# Patient Record
Sex: MALE | Race: WHITE | NOT HISPANIC OR LATINO | Employment: OTHER | URBAN - NONMETROPOLITAN AREA
[De-identification: names, ages, dates, MRNs, and addresses within clinical notes are randomized per-mention and may not be internally consistent; named-entity substitution may affect disease eponyms.]

---

## 2017-05-04 ENCOUNTER — TRANSCRIBE ORDERS (OUTPATIENT)
Dept: PODIATRY | Facility: CLINIC | Age: 57
End: 2017-05-04

## 2017-05-04 DIAGNOSIS — IMO0002 UNCONTROLLED TYPE 2 DIABETES MELLITUS WITH STAGE 3 CHRONIC KIDNEY DISEASE, WITHOUT LONG-TERM CURRENT USE OF INSULIN: ICD-10-CM

## 2017-05-04 DIAGNOSIS — S99.922A TOE INJURY, LEFT, INITIAL ENCOUNTER: Primary | ICD-10-CM

## 2017-05-08 ENCOUNTER — OFFICE VISIT (OUTPATIENT)
Dept: PODIATRY | Facility: CLINIC | Age: 57
End: 2017-05-08

## 2017-05-08 VITALS — BODY MASS INDEX: 38.36 KG/M2 | HEIGHT: 76 IN | WEIGHT: 315 LBS

## 2017-05-08 DIAGNOSIS — B35.1 ONYCHOMYCOSIS: ICD-10-CM

## 2017-05-08 DIAGNOSIS — E11.42 DIABETIC POLYNEUROPATHY ASSOCIATED WITH TYPE 2 DIABETES MELLITUS (HCC): Primary | ICD-10-CM

## 2017-05-08 DIAGNOSIS — L85.1 ACQUIRED KERATODERMA: ICD-10-CM

## 2017-05-08 DIAGNOSIS — M79.675 PAIN OF TOE OF LEFT FOOT: ICD-10-CM

## 2017-05-08 DIAGNOSIS — L97.521 TOE ULCER, LEFT, LIMITED TO BREAKDOWN OF SKIN (HCC): ICD-10-CM

## 2017-05-08 PROCEDURE — 11055 PARING/CUTG B9 HYPRKER LES 1: CPT | Performed by: PODIATRIST

## 2017-05-08 PROCEDURE — 99203 OFFICE O/P NEW LOW 30 MIN: CPT | Performed by: PODIATRIST

## 2017-05-08 RX ORDER — AMLODIPINE BESYLATE 10 MG/1
TABLET ORAL
COMMUNITY
Start: 2016-09-21

## 2017-05-08 RX ORDER — LANCING DEVICE
EACH MISCELLANEOUS
COMMUNITY
Start: 2016-09-19

## 2017-05-08 RX ORDER — LISINOPRIL 40 MG/1
TABLET ORAL
COMMUNITY
Start: 2016-09-21

## 2017-05-08 RX ORDER — CLOPIDOGREL BISULFATE 75 MG/1
TABLET ORAL
COMMUNITY
Start: 2016-10-27

## 2017-05-08 RX ORDER — ATORVASTATIN CALCIUM 80 MG/1
TABLET, FILM COATED ORAL
COMMUNITY
Start: 2016-10-21

## 2017-05-08 RX ORDER — GLIMEPIRIDE 4 MG/1
TABLET ORAL
COMMUNITY
Start: 2016-06-14

## 2017-05-08 RX ORDER — HYDROCODONE BITARTRATE AND ACETAMINOPHEN 10; 325 MG/1; MG/1
TABLET ORAL
COMMUNITY
Start: 2016-11-04

## 2017-05-08 RX ORDER — CARVEDILOL 25 MG/1
TABLET ORAL
COMMUNITY
Start: 2016-10-21

## 2017-05-08 RX ORDER — FUROSEMIDE 20 MG/1
TABLET ORAL
COMMUNITY
Start: 2016-11-15

## 2017-05-08 RX ORDER — NITROGLYCERIN 0.4 MG/1
TABLET SUBLINGUAL
COMMUNITY
Start: 2016-04-08

## 2017-06-16 ENCOUNTER — OFFICE VISIT (OUTPATIENT)
Dept: PODIATRY | Facility: CLINIC | Age: 57
End: 2017-06-16

## 2017-06-16 VITALS — WEIGHT: 315 LBS | HEIGHT: 76 IN | BODY MASS INDEX: 38.36 KG/M2

## 2017-06-16 DIAGNOSIS — L85.1 ACQUIRED KERATODERMA: ICD-10-CM

## 2017-06-16 DIAGNOSIS — L97.521 TOE ULCER, LEFT, LIMITED TO BREAKDOWN OF SKIN (HCC): ICD-10-CM

## 2017-06-16 DIAGNOSIS — M20.42 HAMMER TOE OF LEFT FOOT: ICD-10-CM

## 2017-06-16 DIAGNOSIS — E11.42 DIABETIC POLYNEUROPATHY ASSOCIATED WITH TYPE 2 DIABETES MELLITUS (HCC): Primary | ICD-10-CM

## 2017-06-16 PROCEDURE — 99213 OFFICE O/P EST LOW 20 MIN: CPT | Performed by: PODIATRIST

## 2017-06-16 NOTE — PROGRESS NOTES
"Yunior Espitia  1960  56 y.o. male   PCP: Dr. Yakov Cervantes last seen May 2017.    06/16/2017    Chief Complaint   Patient presents with   • Left Foot - Follow-up           History of Present Illness    Mr. Espitia is a 56-year-old diabetic male who presents for evaluation of recurrent left 2nd toe painful skin lesion.     No past medical history on file.      No past surgical history on file.      No family history on file.      Social History     Social History   • Marital status:      Spouse name: N/A   • Number of children: N/A   • Years of education: N/A     Occupational History   • Not on file.     Social History Main Topics   • Smoking status: Never Smoker   • Smokeless tobacco: Not on file   • Alcohol use No   • Drug use: Not on file   • Sexual activity: Not on file     Other Topics Concern   • Not on file     Social History Narrative         Current Outpatient Prescriptions   Medication Sig Dispense Refill   • amLODIPine (NORVASC) 10 MG tablet      • aspirin 81 MG tablet Take 81 mg by mouth.     • atorvastatin (LIPITOR) 80 MG tablet      • carvedilol (COREG) 25 MG tablet      • clopidogrel (PLAVIX) 75 MG tablet      • furosemide (LASIX) 20 MG tablet      • glimepiride (AMARYL) 4 MG tablet TAKE 1 TABLET DAILY     • glucose blood (ACCU-CHEK FREDDY PLUS) test strip      • HYDROcodone-acetaminophen (NORCO)  MG per tablet      • lisinopril (PRINIVIL,ZESTRIL) 40 MG tablet      • nitroglycerin (NITROSTAT) 0.4 MG SL tablet Place  under the tongue.     • PHARMACIST [place]ICE LANC[place] [facility] [place]        No current facility-administered medications for this visit.          OBJECTIVE    Ht 76\" (193 cm)  Wt (!) 320 lb (145 kg)  BMI 38.95 kg/m2      Review of Systems   Constitutional:  Denies recent weight loss, weight gain, fever or chills, no change in exercise tolerance  Musculoskeletal: Toe pain.   Skin:  Thickened nails. Shin discoloration.  Neurological:  Burning sensations to feet " b/l.  Psychiatric/Behavioral: Denies depression      Physical Exam   Constitutional: he appears well-developed and well-nourished.   HEENT: Normocephalic. Atraumatic  CV: No tenderness. RRR  Resp: Non-labored respiration. No wheezes.   Psychiatric: he has a normal mood and affect. his   behavior is normal.      Lower Extremity Exam:  Vascular: DP/PT pulses palpable 1+.   Negative hair growth.   1+ perimalleolar edema  Neuro: Protective sensation diminished to lesser toes, b/l.  Vibratory sensation diminished at HIPJ, b/l  DTRs intact  Integument: Left 2nd toe distal clavi noted with pinpoint underlying ulceration. No SOI. +pain on palpation  Atrophic skin noted b/l with xerosis  Web spaces c/d/i  No masses  Musculoskeletal: LE muscle strength 5/5.   Gait normal  Semi-rigid hammertoe deformity toes 2-5 b/l.  Nails 1-5 b/l thickened,  with subungual debris.        ASSESSMENT AND PLAN    Yunior was seen today for follow-up.    Diagnoses and all orders for this visit:    Diabetic polyneuropathy associated with type 2 diabetes mellitus    Acquired keratoderma    Toe ulcer, left, limited to breakdown of skin    Hammer toe of left foot    -Comprehensive DM foot exam performed. Pt educated on importance of tight glucose control and daily foot checks. Pt education materials dispensed.  -Pt educated on padding techniques for rigid hammertoes, crest pads dispensed  -Awaiting DM shoes  -Above noted HPK debrided with 15 blade to epidermis without incident  -Discussed flexor tenotomy to improved 2nd toe contracture. Pt would like to treat with padding and new DM shoes prior to deciding.  -Follow up 3 months PRN          This document has been electronically signed by Db Perea DPM on June 19, 2017 8:00 PM     Db Perea DPM  6/19/2017  8:00 PM